# Patient Record
Sex: MALE | Race: WHITE
[De-identification: names, ages, dates, MRNs, and addresses within clinical notes are randomized per-mention and may not be internally consistent; named-entity substitution may affect disease eponyms.]

---

## 2017-12-29 ENCOUNTER — HOSPITAL ENCOUNTER (OUTPATIENT)
Dept: HOSPITAL 62 - OD | Age: 62
End: 2017-12-29
Attending: INTERNAL MEDICINE
Payer: COMMERCIAL

## 2017-12-29 DIAGNOSIS — Z79.899: ICD-10-CM

## 2017-12-29 DIAGNOSIS — E78.00: Primary | ICD-10-CM

## 2017-12-29 LAB
ALBUMIN SERPL-MCNC: 4 G/DL (ref 3.5–5)
ALP SERPL-CCNC: 62 U/L (ref 38–126)
ALT SERPL-CCNC: 52 U/L (ref 21–72)
ANION GAP SERPL CALC-SCNC: 7 MMOL/L (ref 5–19)
AST SERPL-CCNC: 32 U/L (ref 17–59)
BILIRUB DIRECT SERPL-MCNC: 0.1 MG/DL (ref 0–0.4)
BILIRUB SERPL-MCNC: 0.8 MG/DL (ref 0.2–1.3)
BUN SERPL-MCNC: 18 MG/DL (ref 7–20)
CALCIUM: 9.6 MG/DL (ref 8.4–10.2)
CHLORIDE SERPL-SCNC: 103 MMOL/L (ref 98–107)
CHOLEST SERPL-MCNC: 133.51 MG/DL (ref 0–200)
CO2 SERPL-SCNC: 32 MMOL/L (ref 22–30)
GLUCOSE SERPL-MCNC: 89 MG/DL (ref 75–110)
LDLC SERPL DIRECT ASSAY-MCNC: 70 MG/DL (ref ?–100)
MAGNESIUM SERPL-MCNC: 1.7 MG/DL (ref 1.6–2.3)
POTASSIUM SERPL-SCNC: 4.3 MMOL/L (ref 3.6–5)
PROT SERPL-MCNC: 6.7 G/DL (ref 6.3–8.2)
SODIUM SERPL-SCNC: 142 MMOL/L (ref 137–145)
TRIGL SERPL-MCNC: 61 MG/DL (ref ?–150)
VLDLC SERPL CALC-MCNC: 12 MG/DL (ref 10–31)

## 2017-12-29 PROCEDURE — 80076 HEPATIC FUNCTION PANEL: CPT

## 2017-12-29 PROCEDURE — 36415 COLL VENOUS BLD VENIPUNCTURE: CPT

## 2017-12-29 PROCEDURE — 80048 BASIC METABOLIC PNL TOTAL CA: CPT

## 2017-12-29 PROCEDURE — 80061 LIPID PANEL: CPT

## 2017-12-29 PROCEDURE — 83735 ASSAY OF MAGNESIUM: CPT

## 2018-02-12 ENCOUNTER — HOSPITAL ENCOUNTER (OUTPATIENT)
Dept: HOSPITAL 62 - OD | Age: 63
End: 2018-02-12
Attending: INTERNAL MEDICINE
Payer: COMMERCIAL

## 2018-02-12 DIAGNOSIS — I48.0: Primary | ICD-10-CM

## 2018-02-12 PROCEDURE — 83735 ASSAY OF MAGNESIUM: CPT

## 2018-02-12 PROCEDURE — 36415 COLL VENOUS BLD VENIPUNCTURE: CPT

## 2018-04-03 ENCOUNTER — HOSPITAL ENCOUNTER (OUTPATIENT)
Dept: HOSPITAL 62 - OD | Age: 63
End: 2018-04-03
Attending: INTERNAL MEDICINE
Payer: COMMERCIAL

## 2018-04-03 DIAGNOSIS — Z79.899: Primary | ICD-10-CM

## 2018-04-03 LAB
ALBUMIN SERPL-MCNC: 3.9 G/DL (ref 3.5–5)
ALP SERPL-CCNC: 60 U/L (ref 38–126)
ALT SERPL-CCNC: 36 U/L (ref 21–72)
ANION GAP SERPL CALC-SCNC: 5 MMOL/L (ref 5–19)
AST SERPL-CCNC: 26 U/L (ref 17–59)
BILIRUB DIRECT SERPL-MCNC: 0.4 MG/DL (ref 0–0.4)
BILIRUB SERPL-MCNC: 0.6 MG/DL (ref 0.2–1.3)
BUN SERPL-MCNC: 17 MG/DL (ref 7–20)
CALCIUM: 9.5 MG/DL (ref 8.4–10.2)
CHLORIDE SERPL-SCNC: 106 MMOL/L (ref 98–107)
CO2 SERPL-SCNC: 32 MMOL/L (ref 22–30)
GLUCOSE SERPL-MCNC: 87 MG/DL (ref 75–110)
POTASSIUM SERPL-SCNC: 4.3 MMOL/L (ref 3.6–5)
PROT SERPL-MCNC: 6.6 G/DL (ref 6.3–8.2)
SODIUM SERPL-SCNC: 143 MMOL/L (ref 137–145)

## 2018-04-03 PROCEDURE — 80048 BASIC METABOLIC PNL TOTAL CA: CPT

## 2018-04-03 PROCEDURE — 36415 COLL VENOUS BLD VENIPUNCTURE: CPT

## 2018-04-03 PROCEDURE — 80076 HEPATIC FUNCTION PANEL: CPT

## 2018-04-03 PROCEDURE — 83735 ASSAY OF MAGNESIUM: CPT

## 2018-04-12 ENCOUNTER — HOSPITAL ENCOUNTER (OUTPATIENT)
Dept: HOSPITAL 62 - OD | Age: 63
End: 2018-04-12
Attending: NURSE PRACTITIONER
Payer: COMMERCIAL
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
## 2018-04-12 DIAGNOSIS — S20.211A: Primary | ICD-10-CM

## 2018-04-12 NOTE — RADIOLOGY REPORT (SQ)
EXAM DESCRIPTION:  RIBS RIGHT W/PA CHEST



COMPLETED DATE/TIME:  4/12/2018 11:25 am



REASON FOR STUDY:  CONTUSION OF RIGHT FRONT WALL OF THORAX, INITIAL ENCOUNTER S20.211A  CONTUSION OF 
RIGHT FRONT WALL OF THORAX, INITIAL EN



COMPARISON:  None.



TECHNIQUE:  Frontal view of the chest and additional views of the right ribs acquired.



NUMBER OF VIEWS:  7 views



LIMITATIONS:  None.



FINDINGS:  FRONTAL CXR: No pneumothorax.  No pleural effusion.  No atelectasis or infiltrates.

RIBS: No displaced rib fractures.  No lytic or blastic bony lesions.

OTHER: No other significant finding.



IMPRESSION:  NO PNEUMOTHORAX.  NO DISPLACED RIB FRACTURES.



COMMENT:  SITE OF TRAUMA/COMPLAINT MARKED/STAMP COMPLETED: Yes



TECHNICAL DOCUMENTATION:  JOB ID:  [id number]

 2011 [employer]- All Rights Reserved



Reading location - IP/workstation name: [email or web address]

## 2018-06-12 ENCOUNTER — HOSPITAL ENCOUNTER (OUTPATIENT)
Dept: HOSPITAL 62 - OD | Age: 63
End: 2018-06-12
Attending: INTERNAL MEDICINE
Payer: COMMERCIAL

## 2018-06-12 DIAGNOSIS — I48.2: ICD-10-CM

## 2018-06-12 DIAGNOSIS — Z79.899: ICD-10-CM

## 2018-06-12 DIAGNOSIS — Z12.5: ICD-10-CM

## 2018-06-12 DIAGNOSIS — I10: Primary | ICD-10-CM

## 2018-06-12 DIAGNOSIS — E66.09: ICD-10-CM

## 2018-06-12 DIAGNOSIS — E78.00: ICD-10-CM

## 2018-06-12 DIAGNOSIS — E78.5: ICD-10-CM

## 2018-06-12 LAB
ALBUMIN SERPL-MCNC: 4 G/DL (ref 3.5–5)
ALP SERPL-CCNC: 60 U/L (ref 38–126)
ALT SERPL-CCNC: 35 U/L (ref 21–72)
ANION GAP SERPL CALC-SCNC: 9 MMOL/L (ref 5–19)
ANION GAP SERPL CALC-SCNC: 9 MMOL/L (ref 5–19)
AST SERPL-CCNC: 25 U/L (ref 17–59)
BILIRUB DIRECT SERPL-MCNC: 0.2 MG/DL (ref 0–0.4)
BILIRUB SERPL-MCNC: 0.8 MG/DL (ref 0.2–1.3)
BUN SERPL-MCNC: 18 MG/DL (ref 7–20)
BUN SERPL-MCNC: 18 MG/DL (ref 7–20)
CALCIUM: 9.6 MG/DL (ref 8.4–10.2)
CALCIUM: 9.6 MG/DL (ref 8.4–10.2)
CHLORIDE SERPL-SCNC: 105 MMOL/L (ref 98–107)
CHLORIDE SERPL-SCNC: 105 MMOL/L (ref 98–107)
CHOLEST SERPL-MCNC: 136.76 MG/DL (ref 0–200)
CO2 SERPL-SCNC: 31 MMOL/L (ref 22–30)
CO2 SERPL-SCNC: 31 MMOL/L (ref 22–30)
GLUCOSE SERPL-MCNC: 78 MG/DL (ref 75–110)
GLUCOSE SERPL-MCNC: 78 MG/DL (ref 75–110)
LDLC SERPL DIRECT ASSAY-MCNC: 63 MG/DL (ref ?–100)
POTASSIUM SERPL-SCNC: 4.6 MMOL/L (ref 3.6–5)
POTASSIUM SERPL-SCNC: 4.6 MMOL/L (ref 3.6–5)
PROT SERPL-MCNC: 6.9 G/DL (ref 6.3–8.2)
PSA SERPL-MCNC: 1.42 NG/ML (ref ?–4)
SODIUM SERPL-SCNC: 144.9 MMOL/L (ref 137–145)
SODIUM SERPL-SCNC: 144.9 MMOL/L (ref 137–145)
TRIGL SERPL-MCNC: 77 MG/DL (ref ?–150)
VLDLC SERPL CALC-MCNC: 15 MG/DL (ref 10–31)

## 2018-06-12 PROCEDURE — 84153 ASSAY OF PSA TOTAL: CPT

## 2018-06-12 PROCEDURE — 84443 ASSAY THYROID STIM HORMONE: CPT

## 2018-06-12 PROCEDURE — 80061 LIPID PANEL: CPT

## 2018-06-12 PROCEDURE — 83735 ASSAY OF MAGNESIUM: CPT

## 2018-06-12 PROCEDURE — 80076 HEPATIC FUNCTION PANEL: CPT

## 2018-06-12 PROCEDURE — 83036 HEMOGLOBIN GLYCOSYLATED A1C: CPT

## 2018-06-12 PROCEDURE — 80048 BASIC METABOLIC PNL TOTAL CA: CPT

## 2018-06-12 PROCEDURE — 36415 COLL VENOUS BLD VENIPUNCTURE: CPT

## 2018-08-13 ENCOUNTER — HOSPITAL ENCOUNTER (OUTPATIENT)
Dept: HOSPITAL 62 - OD | Age: 63
End: 2018-08-13
Attending: FAMILY MEDICINE
Payer: COMMERCIAL

## 2018-08-13 DIAGNOSIS — M77.31: ICD-10-CM

## 2018-08-13 DIAGNOSIS — M79.671: Primary | ICD-10-CM

## 2018-08-13 NOTE — RADIOLOGY REPORT (SQ)
EXAM DESCRIPTION:  FOOT RIGHT COMPLETE



COMPLETED DATE/TIME:  8/13/2018 3:58 pm



REASON FOR STUDY:  PAIN IN DISTAL RIGHT FOOT X SEVERAL MONTHS M79.671  PAIN IN RIGHT FOOT



COMPARISON:  None.



NUMBER OF VIEWS:  Three views.



TECHNIQUE:  AP, lateral and oblique  radiographic images acquired of the right foot.



LIMITATIONS:  None.



FINDINGS:  MINERALIZATION: Normal.

BONES: No acute fracture or dislocation.  Calcaneal spurs.  Small ossicle adjacent to the lateral asp
ect of the base of the proximal phalanx of the great toe.

JOINTS: No effusions.

SOFT TISSUES: No soft tissue swelling.  No foreign body.

OTHER: No other significant finding.



IMPRESSION:  1.  No acute osseous findings.

2  Calcaneal spurs.



TECHNICAL DOCUMENTATION:  JOB ID:  [id number]

 2011 [employer]- All Rights Reserved



Reading location - IP/workstation name: [email or web address]

## 2018-12-05 ENCOUNTER — HOSPITAL ENCOUNTER (OUTPATIENT)
Dept: HOSPITAL 62 - OD | Age: 63
End: 2018-12-05
Attending: INTERNAL MEDICINE
Payer: COMMERCIAL

## 2018-12-05 DIAGNOSIS — E78.00: Primary | ICD-10-CM

## 2018-12-05 DIAGNOSIS — Z79.899: ICD-10-CM

## 2018-12-05 DIAGNOSIS — I48.2: ICD-10-CM

## 2018-12-05 LAB
ALBUMIN SERPL-MCNC: 3.9 G/DL (ref 3.5–5)
ALP SERPL-CCNC: 67 U/L (ref 38–126)
ALT SERPL-CCNC: 50 U/L (ref 21–72)
ANION GAP SERPL CALC-SCNC: 9 MMOL/L (ref 5–19)
AST SERPL-CCNC: 39 U/L (ref 17–59)
BILIRUB DIRECT SERPL-MCNC: 0.3 MG/DL (ref 0–0.4)
BILIRUB SERPL-MCNC: 0.7 MG/DL (ref 0.2–1.3)
BUN SERPL-MCNC: 19 MG/DL (ref 7–20)
CALCIUM: 9.3 MG/DL (ref 8.4–10.2)
CHLORIDE SERPL-SCNC: 103 MMOL/L (ref 98–107)
CO2 SERPL-SCNC: 32 MMOL/L (ref 22–30)
GLUCOSE SERPL-MCNC: 59 MG/DL (ref 75–110)
POTASSIUM SERPL-SCNC: 4.2 MMOL/L (ref 3.6–5)
PROT SERPL-MCNC: 6.8 G/DL (ref 6.3–8.2)
SODIUM SERPL-SCNC: 144.3 MMOL/L (ref 137–145)

## 2018-12-05 PROCEDURE — 80061 LIPID PANEL: CPT

## 2018-12-05 PROCEDURE — 36415 COLL VENOUS BLD VENIPUNCTURE: CPT

## 2018-12-05 PROCEDURE — 80076 HEPATIC FUNCTION PANEL: CPT

## 2018-12-05 PROCEDURE — 80048 BASIC METABOLIC PNL TOTAL CA: CPT

## 2018-12-07 LAB
CHOLEST SERPL-MCNC: 128.37 MG/DL (ref 0–200)
LDLC SERPL DIRECT ASSAY-MCNC: 82 MG/DL (ref ?–100)
TRIGL SERPL-MCNC: 58 MG/DL (ref ?–150)
VLDLC SERPL CALC-MCNC: 12 MG/DL (ref 10–31)

## 2019-03-04 ENCOUNTER — HOSPITAL ENCOUNTER (OUTPATIENT)
Dept: HOSPITAL 62 - OD | Age: 64
End: 2019-03-04
Attending: INTERNAL MEDICINE
Payer: COMMERCIAL

## 2019-03-04 DIAGNOSIS — E83.42: ICD-10-CM

## 2019-03-04 DIAGNOSIS — I48.2: ICD-10-CM

## 2019-03-04 DIAGNOSIS — I10: ICD-10-CM

## 2019-03-04 DIAGNOSIS — Z12.5: ICD-10-CM

## 2019-03-04 DIAGNOSIS — E78.5: Primary | ICD-10-CM

## 2019-03-04 DIAGNOSIS — Z79.899: ICD-10-CM

## 2019-03-04 DIAGNOSIS — N40.0: ICD-10-CM

## 2019-03-04 LAB
ANION GAP SERPL CALC-SCNC: 5 MMOL/L (ref 5–19)
BUN SERPL-MCNC: 16 MG/DL (ref 7–20)
CALCIUM: 9.6 MG/DL (ref 8.4–10.2)
CHLORIDE SERPL-SCNC: 105 MMOL/L (ref 98–107)
CO2 SERPL-SCNC: 32 MMOL/L (ref 22–30)
GLUCOSE SERPL-MCNC: 90 MG/DL (ref 75–110)
POTASSIUM SERPL-SCNC: 4.9 MMOL/L (ref 3.6–5)
SODIUM SERPL-SCNC: 142.4 MMOL/L (ref 137–145)

## 2019-03-04 PROCEDURE — 84443 ASSAY THYROID STIM HORMONE: CPT

## 2019-03-04 PROCEDURE — 36415 COLL VENOUS BLD VENIPUNCTURE: CPT

## 2019-03-04 PROCEDURE — 83735 ASSAY OF MAGNESIUM: CPT

## 2019-03-04 PROCEDURE — 80048 BASIC METABOLIC PNL TOTAL CA: CPT

## 2019-03-04 PROCEDURE — 83036 HEMOGLOBIN GLYCOSYLATED A1C: CPT

## 2019-03-04 PROCEDURE — 84153 ASSAY OF PSA TOTAL: CPT

## 2019-03-04 PROCEDURE — 80076 HEPATIC FUNCTION PANEL: CPT

## 2019-03-05 LAB
ALBUMIN SERPL-MCNC: 4 G/DL (ref 3.5–5)
ALP SERPL-CCNC: 66 U/L (ref 38–126)
ALT SERPL-CCNC: 46 U/L (ref 21–72)
AST SERPL-CCNC: 30 U/L (ref 17–59)
BILIRUB DIRECT SERPL-MCNC: 0.1 MG/DL (ref 0–0.4)
BILIRUB SERPL-MCNC: 0.6 MG/DL (ref 0.2–1.3)
PROT SERPL-MCNC: 6.9 G/DL (ref 6.3–8.2)

## 2019-06-05 ENCOUNTER — HOSPITAL ENCOUNTER (OUTPATIENT)
Dept: HOSPITAL 62 - OD | Age: 64
End: 2019-06-05
Attending: INTERNAL MEDICINE
Payer: COMMERCIAL

## 2019-06-05 DIAGNOSIS — Z79.899: Primary | ICD-10-CM

## 2019-06-05 DIAGNOSIS — I48.2: ICD-10-CM

## 2019-06-05 LAB
ANION GAP SERPL CALC-SCNC: 10 MMOL/L (ref 5–19)
BUN SERPL-MCNC: 17 MG/DL (ref 7–20)
CALCIUM: 9.6 MG/DL (ref 8.4–10.2)
CHLORIDE SERPL-SCNC: 103 MMOL/L (ref 98–107)
CO2 SERPL-SCNC: 30 MMOL/L (ref 22–30)
GLUCOSE SERPL-MCNC: 79 MG/DL (ref 75–110)
POTASSIUM SERPL-SCNC: 4.7 MMOL/L (ref 3.6–5)
SODIUM SERPL-SCNC: 142.9 MMOL/L (ref 137–145)

## 2019-06-05 PROCEDURE — 36415 COLL VENOUS BLD VENIPUNCTURE: CPT

## 2019-06-05 PROCEDURE — 80048 BASIC METABOLIC PNL TOTAL CA: CPT

## 2019-06-05 PROCEDURE — 83735 ASSAY OF MAGNESIUM: CPT

## 2019-06-11 ENCOUNTER — HOSPITAL ENCOUNTER (OUTPATIENT)
Dept: HOSPITAL 62 - OD | Age: 64
End: 2019-06-11
Attending: INTERNAL MEDICINE
Payer: COMMERCIAL

## 2019-06-11 DIAGNOSIS — I48.0: Primary | ICD-10-CM

## 2019-06-11 DIAGNOSIS — Z79.899: ICD-10-CM

## 2019-06-11 DIAGNOSIS — R94.5: ICD-10-CM

## 2019-06-11 LAB
ALBUMIN SERPL-MCNC: 4.2 G/DL (ref 3.5–5)
ALP SERPL-CCNC: 68 U/L (ref 38–126)
ALT SERPL-CCNC: 47 U/L (ref 21–72)
AST SERPL-CCNC: 32 U/L (ref 17–59)
BILIRUB DIRECT SERPL-MCNC: 0.3 MG/DL (ref 0–0.4)
BILIRUB SERPL-MCNC: 0.6 MG/DL (ref 0.2–1.3)
PROT SERPL-MCNC: 6.7 G/DL (ref 6.3–8.2)

## 2019-06-11 PROCEDURE — 80076 HEPATIC FUNCTION PANEL: CPT

## 2019-06-11 PROCEDURE — 36415 COLL VENOUS BLD VENIPUNCTURE: CPT

## 2019-11-27 ENCOUNTER — HOSPITAL ENCOUNTER (OUTPATIENT)
Dept: HOSPITAL 62 - OD | Age: 64
End: 2019-11-27
Attending: INTERNAL MEDICINE
Payer: COMMERCIAL

## 2019-11-27 DIAGNOSIS — Z79.899: ICD-10-CM

## 2019-11-27 DIAGNOSIS — I48.0: Primary | ICD-10-CM

## 2019-11-27 DIAGNOSIS — E83.42: ICD-10-CM

## 2019-11-27 LAB
ALBUMIN SERPL-MCNC: 4.1 G/DL (ref 3.5–5)
ALP SERPL-CCNC: 71 U/L (ref 38–126)
ANION GAP SERPL CALC-SCNC: 7 MMOL/L (ref 5–19)
AST SERPL-CCNC: 38 U/L (ref 17–59)
BILIRUB DIRECT SERPL-MCNC: 0.1 MG/DL (ref 0–0.4)
BILIRUB SERPL-MCNC: 0.8 MG/DL (ref 0.2–1.3)
BUN SERPL-MCNC: 16 MG/DL (ref 7–20)
CALCIUM: 9.5 MG/DL (ref 8.4–10.2)
CHLORIDE SERPL-SCNC: 102 MMOL/L (ref 98–107)
CO2 SERPL-SCNC: 31 MMOL/L (ref 22–30)
GLUCOSE SERPL-MCNC: 83 MG/DL (ref 75–110)
POTASSIUM SERPL-SCNC: 4.3 MMOL/L (ref 3.6–5)
PROT SERPL-MCNC: 7.4 G/DL (ref 6.3–8.2)

## 2019-11-27 PROCEDURE — 80048 BASIC METABOLIC PNL TOTAL CA: CPT

## 2019-11-27 PROCEDURE — 80076 HEPATIC FUNCTION PANEL: CPT

## 2019-11-27 PROCEDURE — 83735 ASSAY OF MAGNESIUM: CPT

## 2019-11-27 PROCEDURE — 36415 COLL VENOUS BLD VENIPUNCTURE: CPT

## 2020-01-24 ENCOUNTER — HOSPITAL ENCOUNTER (EMERGENCY)
Dept: HOSPITAL 62 - ER | Age: 65
Discharge: HOME | End: 2020-01-24
Payer: COMMERCIAL

## 2020-01-24 VITALS — DIASTOLIC BLOOD PRESSURE: 74 MMHG | SYSTOLIC BLOOD PRESSURE: 127 MMHG

## 2020-01-24 DIAGNOSIS — J45.909: ICD-10-CM

## 2020-01-24 DIAGNOSIS — R79.89: ICD-10-CM

## 2020-01-24 DIAGNOSIS — I48.91: ICD-10-CM

## 2020-01-24 DIAGNOSIS — R00.0: ICD-10-CM

## 2020-01-24 DIAGNOSIS — Z87.891: ICD-10-CM

## 2020-01-24 DIAGNOSIS — Z88.8: ICD-10-CM

## 2020-01-24 DIAGNOSIS — I10: ICD-10-CM

## 2020-01-24 DIAGNOSIS — Z79.899: ICD-10-CM

## 2020-01-24 DIAGNOSIS — R19.7: ICD-10-CM

## 2020-01-24 DIAGNOSIS — R07.9: Primary | ICD-10-CM

## 2020-01-24 DIAGNOSIS — I25.10: ICD-10-CM

## 2020-01-24 DIAGNOSIS — R00.2: ICD-10-CM

## 2020-01-24 LAB
ADD MANUAL DIFF: NO
ALBUMIN SERPL-MCNC: 4.3 G/DL (ref 3.5–5)
ALP SERPL-CCNC: 98 U/L (ref 38–126)
ANION GAP SERPL CALC-SCNC: 13 MMOL/L (ref 5–19)
AST SERPL-CCNC: 32 U/L (ref 17–59)
BASOPHILS # BLD AUTO: 0 10^3/UL (ref 0–0.2)
BASOPHILS NFR BLD AUTO: 0.4 % (ref 0–2)
BILIRUB DIRECT SERPL-MCNC: 0 MG/DL (ref 0–0.4)
BILIRUB SERPL-MCNC: 1.2 MG/DL (ref 0.2–1.3)
BUN SERPL-MCNC: 21 MG/DL (ref 7–20)
CALCIUM: 9.9 MG/DL (ref 8.4–10.2)
CHLORIDE SERPL-SCNC: 102 MMOL/L (ref 98–107)
CK MB SERPL-MCNC: 1.48 NG/ML (ref ?–4.55)
CK SERPL-CCNC: 88 U/L (ref 55–170)
CO2 SERPL-SCNC: 26 MMOL/L (ref 22–30)
EOSINOPHIL # BLD AUTO: 0.2 10^3/UL (ref 0–0.6)
EOSINOPHIL NFR BLD AUTO: 3.7 % (ref 0–6)
ERYTHROCYTE [DISTWIDTH] IN BLOOD BY AUTOMATED COUNT: 13.3 % (ref 11.5–14)
GLUCOSE SERPL-MCNC: 83 MG/DL (ref 75–110)
HCT VFR BLD CALC: 48.3 % (ref 37.9–51)
HGB BLD-MCNC: 16.6 G/DL (ref 13.5–17)
LYMPHOCYTES # BLD AUTO: 1.7 10^3/UL (ref 0.5–4.7)
LYMPHOCYTES NFR BLD AUTO: 27.1 % (ref 13–45)
MCH RBC QN AUTO: 30.4 PG (ref 27–33.4)
MCHC RBC AUTO-ENTMCNC: 34.4 G/DL (ref 32–36)
MCV RBC AUTO: 88 FL (ref 80–97)
MONOCYTES # BLD AUTO: 0.4 10^3/UL (ref 0.1–1.4)
MONOCYTES NFR BLD AUTO: 7.2 % (ref 3–13)
NEUTROPHILS # BLD AUTO: 3.8 10^3/UL (ref 1.7–8.2)
NEUTS SEG NFR BLD AUTO: 61.6 % (ref 42–78)
PLATELET # BLD: 129 10^3/UL (ref 150–450)
POTASSIUM SERPL-SCNC: 4.1 MMOL/L (ref 3.6–5)
PROT SERPL-MCNC: 7.5 G/DL (ref 6.3–8.2)
RBC # BLD AUTO: 5.47 10^6/UL (ref 4.35–5.55)
TOTAL CELLS COUNTED % (AUTO): 100 %
TROPONIN I SERPL-MCNC: < 0.012 NG/ML
WBC # BLD AUTO: 6.2 10^3/UL (ref 4–10.5)

## 2020-01-24 PROCEDURE — 80053 COMPREHEN METABOLIC PANEL: CPT

## 2020-01-24 PROCEDURE — 99285 EMERGENCY DEPT VISIT HI MDM: CPT

## 2020-01-24 PROCEDURE — 82553 CREATINE MB FRACTION: CPT

## 2020-01-24 PROCEDURE — 96372 THER/PROPH/DIAG INJ SC/IM: CPT

## 2020-01-24 PROCEDURE — 84484 ASSAY OF TROPONIN QUANT: CPT

## 2020-01-24 PROCEDURE — 93005 ELECTROCARDIOGRAM TRACING: CPT

## 2020-01-24 PROCEDURE — 82550 ASSAY OF CK (CPK): CPT

## 2020-01-24 PROCEDURE — 85025 COMPLETE CBC W/AUTO DIFF WBC: CPT

## 2020-01-24 PROCEDURE — 36415 COLL VENOUS BLD VENIPUNCTURE: CPT

## 2020-01-24 PROCEDURE — 84443 ASSAY THYROID STIM HORMONE: CPT

## 2020-01-24 PROCEDURE — 93010 ELECTROCARDIOGRAM REPORT: CPT

## 2020-01-24 PROCEDURE — 71046 X-RAY EXAM CHEST 2 VIEWS: CPT

## 2020-01-24 RX ADMIN — ENOXAPARIN SODIUM SCH MG: 100 INJECTION SUBCUTANEOUS at 13:11

## 2020-01-24 RX ADMIN — ENOXAPARIN SODIUM SCH: 100 INJECTION SUBCUTANEOUS at 22:02

## 2020-01-24 NOTE — EKG REPORT
SEVERITY:- ABNORMAL ECG -

SINUS BRADYCARDIA

PROBABLE LEFT ATRIAL ABNORMALITY

NONSPECIFIC INTRAVENTRICULAR CONDUCTION DELAY

ABNRM R PROG, CONSIDER ASMI OR LEAD PLACEMENT

:

Confirmed by: Ag Hay MD 24-Jan-2020 11:25:30

## 2020-01-24 NOTE — EKG REPORT
SEVERITY:- ABNORMAL ECG -

ATRIAL FIBRILLATION, ACUTE

:

Confirmed by: Ag Hay MD 24-Jan-2020 11:26:41

## 2020-01-24 NOTE — ER DOCUMENT REPORT
ED General





- General


Chief Complaint: Irregular Pulse


Stated Complaint: DIARRHEA


Time Seen by Provider: 01/24/20 08:16


Primary Care Provider: 


EDI JOVEL MD [Primary Care Provider] - Follow up as needed


TRAVEL OUTSIDE OF THE [place] IN LAST 30 DAYS: No





- HPI


Notes: 





65-year-old male with history of atrial fibrillation and panic disorder to the 

emergency department with wife with complaints of palpitations, right-sided 

chest pain, diarrhea.  Patient states that last night about 8 PM he started to 

have diarrhea.  He has had about 7-8 episodes of jorge watery diarrhea.  He sta

rich that this was ongoing throughout the night.  He states he awoke at 5 AM and 

felt like his heart was racing with associated right-sided chest pain.  He 

states that this happens to him and he will pop into atrial fib when he is 

dehydrated, has caffeine, or sugar.  He states that he feels like maybe he is 

dehydrated and that is why he popped into atrial fib.  He states that his heart 

rate is typically about 40 and when he gets above 70 he starts to have symptoms 

of atrial fibrillation.  He is followed by Dr. Arroyo, cardiology.  He is recently

been on a heart monitor and he will get results from that with Dr. Vaz on 

Monday, January 27.  He states that he had his first episode of jorge atrial fib

5 years ago.  He states that he had a heart rate of 170 at that time.  He takes 

metoprolol daily to help control his atrial fib.  His cardiologist has him takes

Cardizem when he pops into paroxysmal rapid ventricular rate atrial for.  He 

states that he did take a Cardizem this morning at about 5 AM.  He states that 

typically when he has an episode of atrial fib he also starts to panic so he a

lso took Valium.  He states that he is feeling better but still feels like his 

heart is racing a little bit.  His wife did give him his morning medication 

which included his metoprolol 25 mg, magnesium.  He states that the chest pain 

only lasted several minutes and has since then resolved.  He was not diaphoretic

or nauseated with this episode of chest pain.  He has had a heart cath 5 years 

ago and has never had a stent.  His cardiologist wants him to do a stress test 

but patient has not been scheduled for that.  He is a non-smoker.





- Related Data


Allergies/Adverse Reactions: 


                                        





cetirizine HCl [From Zyrtec] Allergy (Intermediate, Verified 08/24/16 07:29)


   cardiac dysrhythmias


benedryl Allergy (Intermediate, Uncoded 08/24/16 07:29)


   cardiac dysrhythmias











Past Medical History





- General


Information source: Patient, Relative





- Social History


Smoking Status: Former Smoker


Chew tobacco use (# tins/day): No


Frequency of alcohol use: None


Drug Abuse: None


Lives with: Spouse/Significant other


Family History: Reviewed & Not Pertinent


Patient has suicidal ideation: No


Patient has homicidal ideation: No





- Past Medical History


Cardiac Medical History: Reports: Hx Atrial Fibrillation, Hx Coronary Artery 

Disease, Hx Hypercholesterolemia - lipitor, Hx Hypertension - on meds


   Denies: Hx Congestive Heart Failure, Hx Heart Attack


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


   Denies: Hx Bronchitis, Hx COPD, Hx Pneumonia, Hx Tuberculosis


Neurological Medical History: Reports: Hx Migraine.  Denies: Hx Cerebrovascular 

Accident, Hx Seizures


Endocrine Medical History: Denies: Hx Diabetes Mellitus Type 1, Hx Diabetes 

Mellitus Type 2


Renal/ Medical History: Denies: Hx End Stage Renal Disease, Hx Kidney Stones


GI Medical History: Reports: Hx Gastroesophageal Reflux Disease - takes otc 

pepcid, Hx Ulcer.  Denies: Hx Hiatal Hernia


Musculoskeletal Medical History: Reports Hx Arthritis - Shoulder


Psychiatric Medical History: 


   Denies: Hx Attention Deficit Hyperactivity Disorder, Hx Bipolar Disorder, Hx 

Depression, Hx Schizophrenia


Past Surgical History: Reports: Hx Oral Surgery, Hx Tonsillectomy.  Denies: Hx 

Abdominal Surgery, Hx Appendectomy, Hx Bowel Surgery, Hx Cardiac 

Catheterization, Hx Cardiac Surgery, Hx Cholecystectomy, Hx Genitourinary 

Surgery, Hx Kidney (Renal Surgery), Hx Neurologic Surgery, Hx Nose Surgery, Hx 

Open Heart Surgery, Hx Orthopedic Surgery, Hx Pacemaker, Hx Pancreatic Surgery, 

Hx Pituitary Surgery, Hx Rectal Surgery, Hx Testicular Surgery, Hx Thyroid 

Surgery, Hx Urinary Tract Surgery





- Immunizations


Hx Diphtheria, Pertussis, Tetanus Vaccination: Yes





Review of Systems





- Review of Systems


Constitutional: denies: Chills, Diaphoresis, Fever


EENT: No symptoms reported


Cardiovascular: Chest pain, Palpitations, Heart racing.  denies: Dyspnea, 

Syncope, Dizziness, Lightheaded


Respiratory: denies: Cough, Short of breath


Gastrointestinal: Diarrhea.  denies: Abdominal pain, Nausea, Vomiting


Genitourinary: No symptoms reported


Musculoskeletal: No symptoms reported


Skin: No symptoms reported


Hematologic/Lymphatic: No symptoms reported


Neurological/Psychological: No symptoms reported.  denies: Weakness, Lost 

consciousness, Headaches


-: Yes All other systems reviewed and negative





Physical Exam





- Vital signs


Vitals: 


                                        











Pulse Ox


 


 94 


 


 01/24/20 07:11








                                                                Selected Entries











  01/24/20





  07:22


 


Temperature 97.5 F


 


Heart Rate ( 99





Monitors) 


 


Respiratory 17





Rate 


 


Blood Pressure 121/85


 


Blood Pressure 97





Mean 


 


O2 Sat by Pulse 98





Oximetry 








Interpretation: Normal





- General


General appearance: Appears well, Alert, Anxious


In distress: None


Notes: 





Patient is slightly nervous but in no acute distress





- HEENT


Head: Normocephalic, Atraumatic


Eyes: Normal


Pupils: PERRL





- Respiratory


Respiratory status: No respiratory distress


Chest status: Nontender.  No: Pain with deep breathing, Accessory muscle use


Breath sounds: Normal.  No: Rales, Rhonchi, Stridor, Wheezing


Chest palpation: Normal





- Cardiovascular


Rhythm: Regular


Heart sounds: Normal auscultation


Murmur: No


Notes: 





No pitting edema





- Abdominal


Inspection: Normal


Distension: No distension


Bowel sounds: Normal


Tenderness: Nontender.  No: McBurney's point, Ramirez's sign, Guarding, Rebound


Organomegaly: No organomegaly





- Back


Back: Normal, Nontender.  No: CVA tenderness





- Extremities


General upper extremity: Normal inspection, Nontender, Normal color, Normal ROM,

Normal temperature


General lower extremity: Normal inspection, Nontender, Normal color, Normal ROM,

Normal temperature, Normal weight bearing.  No: Kristin's sign





- Neurological


Neuro grossly intact: Yes


Cognition: Normal


Orientation: AAOx4


[place]falls Coma Scale Eye Opening: Spontaneous


Charleen Coma Scale Verbal: Oriented


Charleen Coma Scale Motor: Obeys Commands


[place]falls Coma Scale Total: 15


Speech: Normal


Cranial nerves: Normal


Cerebellar coordination: Normal


Motor strength normal: LUE, RUE, LLE, RLE


Additional motor exam normals: Equal 


Sensory: Normal





- Psychological


Associated symptoms: Normal affect, Normal mood





- Skin


Skin Temperature: Warm


Skin Moisture: Dry


Skin Color: Normal





Course





- Re-evaluation


Re-evalutation: 





01/24/20 


Noted upward going troponin.  Patient has been chest pain-free since arrival 

here in the emergency department.  I discussed the patient with my attending Dr. Gauthier and he agrees that patient will need to be admitted for trending of his 

troponin.  Go ahead and start on Lovenox.





Spoke with hospitalist team here at [place] and they would like for me to consult

patient's cardiologist Dr. Vaz to see if he is capable for intervention should 

the patient's troponin increase.





Spoke with Dr. Vaz and he is not capable of cardiac intervention over the 

weekend.  After much discussion and further discussion with Dr. Gauthier he 

agrees that patient can come in and have trending of his troponins.  He thinks 

that patient will need to be transferred in case there is a need for resource 

for cardiac intervention.  Updated Dr. Gauthier.  Patient would like to go to 

[place] to [facility].





Spoke with Dr. Janes Farias hospitalist at [facility].  He agrees with pl

an for transfer.  He is aware of patient's history and EKG findings as well as 

heart rate.  He is aware of trending troponin.  He agrees with plan for dose of 

Lovenox here.  I will call Dr. Farias should repeat troponins continue to 

rise from this.  He agrees





Impression: Atrial fib, upward going troponin could be a NSTEMI in nature.  

Patient has had aspirin as well as Lovenox.  He is currently chest pain peak 

free.  Trending EKGs do not show any ischemic changes.  He has been accepted for

transfer to [facility] and we will continue to monitor his troponins.  Patient 

agrees with the plan.

















- Vital Signs


Vital signs: 


                                        











Temp Pulse Resp BP Pulse Ox


 


 97.5 F      12   116/64   100 


 


 01/24/20 07:22     01/24/20 15:01  01/24/20 15:00  01/24/20 15:01














- Laboratory


Result Diagrams: 


                                 01/24/20 06:20





                                 01/24/20 06:20


Laboratory results interpreted by me: 


                                        











  01/24/20 01/24/20





  06:20 06:20


 


Plt Count  129 L 


 


BUN   21 H














- Diagnostic Test


Radiology reviewed: Image reviewed, Reports reviewed





- EKG Interpretation by Me


Additional EKG results interpreted by me: 





01/24/20 09:37


Rate: 78, rhythm: Atrial fib without rapid ventricular rate, no STEMI no 

contiguous ST changes,  normal axis; no significant changes to comparison from 

1/10/2015.








Discharge





- Discharge


Clinical Impression: 


 Elevated troponin, Palpitations, Diarrhea





Chest pain


Qualifiers:


 Chest pain type: unspecified Qualified Code(s): R07.9 - Chest pain, unspecified





Atrial fibrillation


Qualifiers:


 Atrial fibrillation type: unspecified Qualified Code(s): I48.91 - Unspecified 

atrial fibrillation





Condition: Stable


Disposition: [facility]


Referrals: 


EDI JOVEL MD [Primary Care Provider] - Follow up as needed

## 2020-01-24 NOTE — RADIOLOGY REPORT (SQ)
EXAM DESCRIPTION:  CHEST 2 VIEWS



COMPLETED DATE/TIME:  1/24/2020 9:16 am



REASON FOR STUDY:  palpitations, cp



COMPARISON:  AP view of the chest from 1/10/2015.



EXAM PARAMETERS:  NUMBER OF VIEWS: two views

TECHNIQUE:  PA and lateral views of the chest were obtained.

RADIATION DOSE: NA

LIMITATIONS: none



FINDINGS:  LUNGS AND PLEURA: No consolidation, pleural effusion or pneumothorax.

MEDIASTINUM AND HILAR STRUCTURES: No mediastinal or hilar contour abnormality.

HEART AND VASCULAR STRUCTURES: The cardiac silhouette and pulmonary vasculature are within normal berkwoitz
its.

BONES: No acute findings.

HARDWARE: None in the chest.

OTHER: No other finding.



IMPRESSION:  No acute cardiopulmonary process.



TECHNICAL DOCUMENTATION:  JOB ID:  [id number]

 2011 [employer]- All Rights Reserved



Reading location - IP/workstation name: [email or web address]

## 2020-01-24 NOTE — EKG REPORT
SEVERITY:- ABNORMAL ECG -

SINUS BRADYCARDIA

NONSPECIFIC INTRAVENTRICULAR CONDUCTION DELAY

BORDERLINE R WAVE PROGRESSION, ANTERIOR LEADS

:

Confirmed by: Ag Hay MD 24-Jan-2020 17:46:42

## 2020-01-25 NOTE — EKG REPORT
SEVERITY:- ABNORMAL ECG -

SINUS BRADYCARDIA

PROBABLE LEFT ATRIAL ABNORMALITY

NONSPECIFIC INTRAVENTRICULAR CONDUCTION DELAY

BORDERLINE R WAVE PROGRESSION, ANTERIOR LEADS

:

Confirmed by: Ag Hay MD 25-Jan-2020 10:52:09

## 2020-01-27 ENCOUNTER — HOSPITAL ENCOUNTER (OUTPATIENT)
Dept: HOSPITAL 62 - OD | Age: 65
End: 2020-01-27
Attending: INTERNAL MEDICINE
Payer: COMMERCIAL

## 2020-01-27 DIAGNOSIS — R07.9: ICD-10-CM

## 2020-01-27 DIAGNOSIS — Z79.01: ICD-10-CM

## 2020-01-27 DIAGNOSIS — R07.89: ICD-10-CM

## 2020-01-27 DIAGNOSIS — Z01.810: Primary | ICD-10-CM

## 2020-01-27 LAB
ANION GAP SERPL CALC-SCNC: 7 MMOL/L (ref 5–19)
APTT BLD: 34.1 SEC (ref 23.5–35.8)
BUN SERPL-MCNC: 14 MG/DL (ref 7–20)
CALCIUM: 9.7 MG/DL (ref 8.4–10.2)
CHLORIDE SERPL-SCNC: 104 MMOL/L (ref 98–107)
CO2 SERPL-SCNC: 31 MMOL/L (ref 22–30)
ERYTHROCYTE [DISTWIDTH] IN BLOOD BY AUTOMATED COUNT: 13.2 % (ref 11.5–14)
GLUCOSE SERPL-MCNC: 86 MG/DL (ref 75–110)
HCT VFR BLD CALC: 44.3 % (ref 37.9–51)
HGB BLD-MCNC: 15.1 G/DL (ref 13.5–17)
INR PPP: 1.09
MCH RBC QN AUTO: 30.5 PG (ref 27–33.4)
MCHC RBC AUTO-ENTMCNC: 34.2 G/DL (ref 32–36)
MCV RBC AUTO: 89 FL (ref 80–97)
PLATELET # BLD: 140 10^3/UL (ref 150–450)
POTASSIUM SERPL-SCNC: 4.8 MMOL/L (ref 3.6–5)
PROTHROMBIN TIME: 14.1 SEC (ref 11.4–15.4)
RBC # BLD AUTO: 4.97 10^6/UL (ref 4.35–5.55)
WBC # BLD AUTO: 5.8 10^3/UL (ref 4–10.5)

## 2020-01-27 PROCEDURE — 80048 BASIC METABOLIC PNL TOTAL CA: CPT

## 2020-01-27 PROCEDURE — 85730 THROMBOPLASTIN TIME PARTIAL: CPT

## 2020-01-27 PROCEDURE — 85610 PROTHROMBIN TIME: CPT

## 2020-01-27 PROCEDURE — 36415 COLL VENOUS BLD VENIPUNCTURE: CPT

## 2020-01-27 PROCEDURE — 85027 COMPLETE CBC AUTOMATED: CPT

## 2020-03-09 ENCOUNTER — HOSPITAL ENCOUNTER (OUTPATIENT)
Dept: HOSPITAL 62 - OD | Age: 65
End: 2020-03-09
Attending: INTERNAL MEDICINE
Payer: COMMERCIAL

## 2020-03-09 DIAGNOSIS — Z79.01: ICD-10-CM

## 2020-03-09 DIAGNOSIS — I48.0: Primary | ICD-10-CM

## 2020-03-09 DIAGNOSIS — Z79.899: ICD-10-CM

## 2020-03-09 LAB
ALBUMIN SERPL-MCNC: 4.3 G/DL (ref 3.5–5)
ALP SERPL-CCNC: 69 U/L (ref 38–126)
ANION GAP SERPL CALC-SCNC: 7 MMOL/L (ref 5–19)
APPEARANCE UR: CLEAR
APTT PPP: YELLOW S
AST SERPL-CCNC: 33 U/L (ref 17–59)
BILIRUB DIRECT SERPL-MCNC: 0 MG/DL (ref 0–0.4)
BILIRUB SERPL-MCNC: 0.8 MG/DL (ref 0.2–1.3)
BILIRUB UR QL STRIP: NEGATIVE
BUN SERPL-MCNC: 14 MG/DL (ref 7–20)
CALCIUM: 9.3 MG/DL (ref 8.4–10.2)
CHLORIDE SERPL-SCNC: 102 MMOL/L (ref 98–107)
CO2 SERPL-SCNC: 32 MMOL/L (ref 22–30)
ERYTHROCYTE [DISTWIDTH] IN BLOOD BY AUTOMATED COUNT: 13.6 % (ref 11.5–14)
GLUCOSE SERPL-MCNC: 80 MG/DL (ref 75–110)
GLUCOSE UR STRIP-MCNC: NEGATIVE MG/DL
HCT VFR BLD CALC: 43.6 % (ref 37.9–51)
HGB BLD-MCNC: 14.8 G/DL (ref 13.5–17)
KETONES UR STRIP-MCNC: NEGATIVE MG/DL
MCH RBC QN AUTO: 30.4 PG (ref 27–33.4)
MCHC RBC AUTO-ENTMCNC: 34 G/DL (ref 32–36)
MCV RBC AUTO: 89 FL (ref 80–97)
NITRITE UR QL STRIP: NEGATIVE
PH UR STRIP: 7 [PH] (ref 5–9)
PLATELET # BLD: 134 10^3/UL (ref 150–450)
POTASSIUM SERPL-SCNC: 4.6 MMOL/L (ref 3.6–5)
PROT SERPL-MCNC: 7.3 G/DL (ref 6.3–8.2)
PROT UR STRIP-MCNC: NEGATIVE MG/DL
RBC # BLD AUTO: 4.88 10^6/UL (ref 4.35–5.55)
SP GR UR STRIP: 1.02
UROBILINOGEN UR-MCNC: NEGATIVE MG/DL (ref ?–2)
WBC # BLD AUTO: 5.7 10^3/UL (ref 4–10.5)

## 2020-03-09 PROCEDURE — 83735 ASSAY OF MAGNESIUM: CPT

## 2020-03-09 PROCEDURE — 82272 OCCULT BLD FECES 1-3 TESTS: CPT

## 2020-03-09 PROCEDURE — 85730 THROMBOPLASTIN TIME PARTIAL: CPT

## 2020-03-09 PROCEDURE — 81001 URINALYSIS AUTO W/SCOPE: CPT

## 2020-03-09 PROCEDURE — 85027 COMPLETE CBC AUTOMATED: CPT

## 2020-03-09 PROCEDURE — 80048 BASIC METABOLIC PNL TOTAL CA: CPT

## 2020-03-09 PROCEDURE — 36415 COLL VENOUS BLD VENIPUNCTURE: CPT

## 2020-03-09 PROCEDURE — 80076 HEPATIC FUNCTION PANEL: CPT

## 2020-06-03 ENCOUNTER — HOSPITAL ENCOUNTER (OUTPATIENT)
Dept: HOSPITAL 62 - OD | Age: 65
End: 2020-06-03
Attending: INTERNAL MEDICINE
Payer: COMMERCIAL

## 2020-06-03 DIAGNOSIS — Z79.01: ICD-10-CM

## 2020-06-03 DIAGNOSIS — Z79.899: ICD-10-CM

## 2020-06-03 DIAGNOSIS — I48.0: Primary | ICD-10-CM

## 2020-06-03 LAB
ALBUMIN SERPL-MCNC: 4 G/DL (ref 3.5–5)
ALP SERPL-CCNC: 65 U/L (ref 38–126)
ANION GAP SERPL CALC-SCNC: 5 MMOL/L (ref 5–19)
APPEARANCE UR: CLEAR
APTT PPP: YELLOW S
AST SERPL-CCNC: 30 U/L (ref 17–59)
BILIRUB DIRECT SERPL-MCNC: 0 MG/DL (ref 0–0.4)
BILIRUB SERPL-MCNC: 0.7 MG/DL (ref 0.2–1.3)
BILIRUB UR QL STRIP: NEGATIVE
BUN SERPL-MCNC: 15 MG/DL (ref 7–20)
CALCIUM: 9.4 MG/DL (ref 8.4–10.2)
CHLORIDE SERPL-SCNC: 103 MMOL/L (ref 98–107)
CO2 SERPL-SCNC: 32 MMOL/L (ref 22–30)
ERYTHROCYTE [DISTWIDTH] IN BLOOD BY AUTOMATED COUNT: 13.5 % (ref 11.5–14)
GLUCOSE SERPL-MCNC: 88 MG/DL (ref 75–110)
GLUCOSE UR STRIP-MCNC: NEGATIVE MG/DL
HCT VFR BLD CALC: 41.5 % (ref 37.9–51)
HGB BLD-MCNC: 14.2 G/DL (ref 13.5–17)
KETONES UR STRIP-MCNC: NEGATIVE MG/DL
MCH RBC QN AUTO: 30.7 PG (ref 27–33.4)
MCHC RBC AUTO-ENTMCNC: 34.3 G/DL (ref 32–36)
MCV RBC AUTO: 90 FL (ref 80–97)
NITRITE UR QL STRIP: NEGATIVE
PH UR STRIP: 7 [PH] (ref 5–9)
PLATELET # BLD: 137 10^3/UL (ref 150–450)
POTASSIUM SERPL-SCNC: 4.9 MMOL/L (ref 3.6–5)
PROT SERPL-MCNC: 6.9 G/DL (ref 6.3–8.2)
PROT UR STRIP-MCNC: NEGATIVE MG/DL
RBC # BLD AUTO: 4.63 10^6/UL (ref 4.35–5.55)
SP GR UR STRIP: 1.02
UROBILINOGEN UR-MCNC: NEGATIVE MG/DL (ref ?–2)
WBC # BLD AUTO: 5.7 10^3/UL (ref 4–10.5)

## 2020-06-03 PROCEDURE — 83735 ASSAY OF MAGNESIUM: CPT

## 2020-06-03 PROCEDURE — 85027 COMPLETE CBC AUTOMATED: CPT

## 2020-06-03 PROCEDURE — 85730 THROMBOPLASTIN TIME PARTIAL: CPT

## 2020-06-03 PROCEDURE — 81001 URINALYSIS AUTO W/SCOPE: CPT

## 2020-06-03 PROCEDURE — 80076 HEPATIC FUNCTION PANEL: CPT

## 2020-06-03 PROCEDURE — 80048 BASIC METABOLIC PNL TOTAL CA: CPT

## 2020-06-03 PROCEDURE — 36415 COLL VENOUS BLD VENIPUNCTURE: CPT

## 2020-06-03 PROCEDURE — 82272 OCCULT BLD FECES 1-3 TESTS: CPT

## 2020-07-10 ENCOUNTER — HOSPITAL ENCOUNTER (OUTPATIENT)
Dept: HOSPITAL 62 - OD | Age: 65
End: 2020-07-10
Attending: FAMILY MEDICINE
Payer: COMMERCIAL

## 2020-07-10 DIAGNOSIS — K92.2: ICD-10-CM

## 2020-07-10 DIAGNOSIS — N40.0: ICD-10-CM

## 2020-07-10 DIAGNOSIS — Z79.01: Primary | ICD-10-CM

## 2020-07-10 LAB
ERYTHROCYTE [DISTWIDTH] IN BLOOD BY AUTOMATED COUNT: 13.4 % (ref 11.5–14)
HCT VFR BLD CALC: 43.1 % (ref 37.9–51)
HGB BLD-MCNC: 14.7 G/DL (ref 13.5–17)
MCH RBC QN AUTO: 30.4 PG (ref 27–33.4)
MCHC RBC AUTO-ENTMCNC: 34 G/DL (ref 32–36)
MCV RBC AUTO: 89 FL (ref 80–97)
PLATELET # BLD: 146 10^3/UL (ref 150–450)
RBC # BLD AUTO: 4.83 10^6/UL (ref 4.35–5.55)
WBC # BLD AUTO: 5.7 10^3/UL (ref 4–10.5)

## 2020-07-10 PROCEDURE — 84153 ASSAY OF PSA TOTAL: CPT

## 2020-07-10 PROCEDURE — 85027 COMPLETE CBC AUTOMATED: CPT

## 2020-07-10 PROCEDURE — 36415 COLL VENOUS BLD VENIPUNCTURE: CPT

## 2020-07-10 PROCEDURE — 82272 OCCULT BLD FECES 1-3 TESTS: CPT

## 2020-09-02 ENCOUNTER — HOSPITAL ENCOUNTER (OUTPATIENT)
Dept: HOSPITAL 62 - OD | Age: 65
End: 2020-09-02
Attending: INTERNAL MEDICINE
Payer: COMMERCIAL

## 2020-09-02 DIAGNOSIS — Z79.899: ICD-10-CM

## 2020-09-02 DIAGNOSIS — R00.1: ICD-10-CM

## 2020-09-02 DIAGNOSIS — I48.0: Primary | ICD-10-CM

## 2020-09-02 DIAGNOSIS — Z79.01: ICD-10-CM

## 2020-09-02 LAB
ALBUMIN SERPL-MCNC: 4.1 G/DL (ref 3.5–5)
ALP SERPL-CCNC: 65 U/L (ref 38–126)
ANION GAP SERPL CALC-SCNC: 8 MMOL/L (ref 5–19)
APPEARANCE UR: CLEAR
APTT PPP: (no result) S
AST SERPL-CCNC: 31 U/L (ref 17–59)
BILIRUB DIRECT SERPL-MCNC: 0.2 MG/DL (ref 0–0.4)
BILIRUB SERPL-MCNC: 0.8 MG/DL (ref 0.2–1.3)
BILIRUB UR QL STRIP: NEGATIVE
BUN SERPL-MCNC: 16 MG/DL (ref 7–20)
CALCIUM: 9.3 MG/DL (ref 8.4–10.2)
CHLORIDE SERPL-SCNC: 104 MMOL/L (ref 98–107)
CO2 SERPL-SCNC: 30 MMOL/L (ref 22–30)
ERYTHROCYTE [DISTWIDTH] IN BLOOD BY AUTOMATED COUNT: 13.5 % (ref 11.5–14)
GLUCOSE SERPL-MCNC: 77 MG/DL (ref 75–110)
GLUCOSE UR STRIP-MCNC: NEGATIVE MG/DL
HCT VFR BLD CALC: 41.9 % (ref 37.9–51)
HGB BLD-MCNC: 14.4 G/DL (ref 13.5–17)
KETONES UR STRIP-MCNC: NEGATIVE MG/DL
MCH RBC QN AUTO: 30.7 PG (ref 27–33.4)
MCHC RBC AUTO-ENTMCNC: 34.5 G/DL (ref 32–36)
MCV RBC AUTO: 89 FL (ref 80–97)
NITRITE UR QL STRIP: NEGATIVE
PH UR STRIP: 6 [PH] (ref 5–9)
PLATELET # BLD: 121 10^3/UL (ref 150–450)
POTASSIUM SERPL-SCNC: 3.9 MMOL/L (ref 3.6–5)
PROT SERPL-MCNC: 6.8 G/DL (ref 6.3–8.2)
PROT UR STRIP-MCNC: NEGATIVE MG/DL
RBC # BLD AUTO: 4.71 10^6/UL (ref 4.35–5.55)
SP GR UR STRIP: 1
UROBILINOGEN UR-MCNC: NEGATIVE MG/DL (ref ?–2)
WBC # BLD AUTO: 4.5 10^3/UL (ref 4–10.5)

## 2020-09-02 PROCEDURE — 81001 URINALYSIS AUTO W/SCOPE: CPT

## 2020-09-02 PROCEDURE — 85730 THROMBOPLASTIN TIME PARTIAL: CPT

## 2020-09-02 PROCEDURE — 84443 ASSAY THYROID STIM HORMONE: CPT

## 2020-09-02 PROCEDURE — 83735 ASSAY OF MAGNESIUM: CPT

## 2020-09-02 PROCEDURE — 82272 OCCULT BLD FECES 1-3 TESTS: CPT

## 2020-09-02 PROCEDURE — 80048 BASIC METABOLIC PNL TOTAL CA: CPT

## 2020-09-02 PROCEDURE — 80076 HEPATIC FUNCTION PANEL: CPT

## 2020-09-02 PROCEDURE — 36415 COLL VENOUS BLD VENIPUNCTURE: CPT

## 2020-09-02 PROCEDURE — 85027 COMPLETE CBC AUTOMATED: CPT

## 2020-11-25 ENCOUNTER — HOSPITAL ENCOUNTER (OUTPATIENT)
Dept: HOSPITAL 62 - OD | Age: 65
End: 2020-11-25
Attending: INTERNAL MEDICINE
Payer: COMMERCIAL

## 2020-11-25 DIAGNOSIS — Z79.899: ICD-10-CM

## 2020-11-25 DIAGNOSIS — I48.0: Primary | ICD-10-CM

## 2020-11-25 DIAGNOSIS — Z79.01: ICD-10-CM

## 2020-11-25 LAB
ALBUMIN SERPL-MCNC: 4.1 G/DL (ref 3.5–5)
ALP SERPL-CCNC: 73 U/L (ref 38–126)
ANION GAP SERPL CALC-SCNC: 12 MMOL/L (ref 5–19)
APPEARANCE UR: CLEAR
APTT PPP: YELLOW S
AST SERPL-CCNC: 29 U/L (ref 17–59)
BILIRUB DIRECT SERPL-MCNC: 0 MG/DL (ref 0–0.4)
BILIRUB SERPL-MCNC: 0.9 MG/DL (ref 0.2–1.3)
BILIRUB UR QL STRIP: NEGATIVE
BUN SERPL-MCNC: 19 MG/DL (ref 7–20)
CALCIUM: 9.6 MG/DL (ref 8.4–10.2)
CHLORIDE SERPL-SCNC: 104 MMOL/L (ref 98–107)
CO2 SERPL-SCNC: 26 MMOL/L (ref 22–30)
ERYTHROCYTE [DISTWIDTH] IN BLOOD BY AUTOMATED COUNT: 13.2 % (ref 11.5–14)
GLUCOSE SERPL-MCNC: 104 MG/DL (ref 75–110)
GLUCOSE UR STRIP-MCNC: NEGATIVE MG/DL
HCT VFR BLD CALC: 42 % (ref 37.9–51)
HGB BLD-MCNC: 14.5 G/DL (ref 13.5–17)
KETONES UR STRIP-MCNC: NEGATIVE MG/DL
MCH RBC QN AUTO: 30.4 PG (ref 27–33.4)
MCHC RBC AUTO-ENTMCNC: 34.5 G/DL (ref 32–36)
MCV RBC AUTO: 88 FL (ref 80–97)
NITRITE UR QL STRIP: NEGATIVE
PH UR STRIP: 5 [PH] (ref 5–9)
PLATELET # BLD: 134 10^3/UL (ref 150–450)
POTASSIUM SERPL-SCNC: 4.4 MMOL/L (ref 3.6–5)
PROT SERPL-MCNC: 7.2 G/DL (ref 6.3–8.2)
PROT UR STRIP-MCNC: NEGATIVE MG/DL
RBC # BLD AUTO: 4.76 10^6/UL (ref 4.35–5.55)
SP GR UR STRIP: 1.02
UROBILINOGEN UR-MCNC: NEGATIVE MG/DL (ref ?–2)
WBC # BLD AUTO: 9.4 10^3/UL (ref 4–10.5)

## 2020-11-25 PROCEDURE — 80076 HEPATIC FUNCTION PANEL: CPT

## 2020-11-25 PROCEDURE — 80048 BASIC METABOLIC PNL TOTAL CA: CPT

## 2020-11-25 PROCEDURE — 82272 OCCULT BLD FECES 1-3 TESTS: CPT

## 2020-11-25 PROCEDURE — 85027 COMPLETE CBC AUTOMATED: CPT

## 2020-11-25 PROCEDURE — 85730 THROMBOPLASTIN TIME PARTIAL: CPT

## 2020-11-25 PROCEDURE — 36415 COLL VENOUS BLD VENIPUNCTURE: CPT

## 2020-11-25 PROCEDURE — 81001 URINALYSIS AUTO W/SCOPE: CPT

## 2020-11-25 PROCEDURE — 83735 ASSAY OF MAGNESIUM: CPT
